# Patient Record
Sex: MALE | Race: BLACK OR AFRICAN AMERICAN | NOT HISPANIC OR LATINO | URBAN - METROPOLITAN AREA
[De-identification: names, ages, dates, MRNs, and addresses within clinical notes are randomized per-mention and may not be internally consistent; named-entity substitution may affect disease eponyms.]

---

## 2017-01-04 ENCOUNTER — DOCUMENTATION ONLY (OUTPATIENT)
Dept: REHABILITATION | Facility: HOSPITAL | Age: 57
End: 2017-01-04

## 2017-01-04 DIAGNOSIS — M25.559 ARTHRALGIA OF HIP, UNSPECIFIED LATERALITY: ICD-10-CM

## 2017-01-04 NOTE — PROGRESS NOTES
Visit:10    Name: Denzel Montano  [facility] Number: [id number]  Evaluation 4/20/16  Date of last visit: 7/19/16    Diagnosis:   Encounter Diagnosis   Name Primary?    Arthralgia of hip, unspecified laterality        DOS: 1/12/2016 L NANCY anterior approach      Subjective  Patient attended 10 PT visits then did not return to therapy after the last re-assessment.     Pain Scale: Denzel rates pain to be 3 on a scale of 1-10 at last visit.      Objective- at last visit.    G-Codes: Foto hip survey : 43% limitation  Discharge: CK 40-60% limited  Goal: CJ 20-40% limited        Passive Range of Motion: L hip 110 deg flex, 45 abd, 25 IR and 30 ER           Lower Extremity Strength: 3-/5 hip abductors and IR/ER; 5/5 quads/hams, L HF 4-/5    Gait: Denzel ambulates without assistive device, mild antalgic gait      Treatment: Consisted of there ex's, HEP, CP, pt education    Assessment  Patient with some improvements in gait when not fatigued and able to ambulate without AD. Patient still has moderate L hip glut med weakness and decreased hip flexor strength. .     GOALS: Short Term Goals:  4 weeks  1. Pt will demonstrate increased hip abd strength to 3-/5 to improve ambulation. (met)  3. Independent with HEP to increase patient's tolerance for exercise activity and walking tolerance. (met)    Long Term Goals: 8-12 weeks  1.Report decreased L hip  pain  <   / =  0  /10 with walking and standing activities. (not met)  2.Increased MMT  for  L hip flex to 4+/5 and hip abd to 4/5  to improve gait and allow for .indep ambulation (some progress)  3. Pt will report improvement in overall functional abilities of LE, evidenced by improved score on FOTO hip survey to 20-40% limited (some progress)      Plan  Discharge from PT.

## 2021-12-17 ENCOUNTER — HOSPITAL ENCOUNTER (EMERGENCY)
Age: 61
End: 2021-12-17

## 2022-06-29 ENCOUNTER — TELEPHONE (OUTPATIENT)
Dept: SCHEDULING | Age: 62
End: 2022-06-29

## 2024-02-27 ENCOUNTER — OFFICE VISIT (OUTPATIENT)
Dept: OCCUPATIONAL MEDICINE | Age: 64
End: 2024-02-27

## 2024-02-27 DIAGNOSIS — Z02.1 DRUG SCREENING, PRE-EMPLOYMENT: Primary | ICD-10-CM

## 2024-02-27 PROCEDURE — OH143 RAPID TEST DRUG KIT & COLLECTION 10 PANEL: Performed by: NURSE PRACTITIONER
